# Patient Record
Sex: FEMALE | Race: WHITE | Employment: UNEMPLOYED | URBAN - NONMETROPOLITAN AREA
[De-identification: names, ages, dates, MRNs, and addresses within clinical notes are randomized per-mention and may not be internally consistent; named-entity substitution may affect disease eponyms.]

---

## 2020-10-19 ENCOUNTER — HOSPITAL ENCOUNTER (OUTPATIENT)
Age: 35
Discharge: HOME | End: 2020-10-19
Payer: COMMERCIAL

## 2020-10-19 PROCEDURE — U0003 INFECTIOUS AGENT DETECTION BY NUCLEIC ACID (DNA OR RNA); SEVERE ACUTE RESPIRATORY SYNDROME CORONAVIRUS 2 (SARS-COV-2) (CORONAVIRUS DISEASE [COVID-19]), AMPLIFIED PROBE TECHNIQUE, MAKING USE OF HIGH THROUGHPUT TECHNOLOGIES AS DESCRIBED BY CMS-2020-01-R: HCPCS

## 2020-10-19 RX ORDER — ETONOGESTREL 68 MG/1
68 IMPLANT SUBCUTANEOUS ONCE
COMMUNITY

## 2020-10-19 RX ORDER — CITALOPRAM 20 MG/1
20 TABLET ORAL DAILY
COMMUNITY

## 2020-10-19 RX ORDER — OXYBUTYNIN CHLORIDE 10 MG/1
10 TABLET, EXTENDED RELEASE ORAL DAILY
COMMUNITY

## 2020-10-19 RX ORDER — LISDEXAMFETAMINE DIMESYLATE 60 MG/1
1 CAPSULE ORAL DAILY
COMMUNITY

## 2020-10-19 NOTE — PROGRESS NOTES
In preparation for their surgical procedure above patient was screened for Obstructive Sleep Apnea (ELVIS) using the STOP-Bang Questionnaire by the Pre-Admission Testing department. This is a pre-surgical screening tool for patient safety and serves as a recommendation, this WILL NOT cause cancellation of surgery. STOP-Bang Questionnaire  * Do you currently see a pulmonologist?  No         1. Do you snore loudly (able to be heard in the next room)? No    2. Do you often feel tired or sleepy during the daytime? No       3. Has anyone ever told you that you stop breathing during your sleep? No    4. Do you have or are you being treated for high blood pressure? No      5. BMI more than 35? BMI (Calculated): 40.3        Yes    6. Age over 48 years? 37 y.o. No    7. Neck Circumference greater than 17 inches for male or 16 inches for female? Measured           (visits only)            Not Applicable    8. Gender Male? No      TOTAL SCORE: 1    ELVIS - Low Risk : Yes to 0 - 2 questions  ELVIS - Intermediate Risk : Yes to 3 - 4 questions  ELVIS - High Risk : Yes to 5 - 8 questions    Adapted from:   STOP Questionnaire: A Tool to Screen Patients for Obstructive Sleep Apnea   SUKH Castillo.P.C., Dee Dee Rodriguez M.B.B.S., Kirit Adams M.D., Dayne Potter. Rosalind Lawson, Ph.D., AIDEN Orantes.B.B.S., George Mcmillan, M.Sc., Jessica Funes M.D., Flory Pappas. SUKH Humphrey.P.C.    Anesthesiology 2008; 115:276-24 Copyright 2008, the [address] [employer], [facility] 37.   ----------------------------------------------------------------------------------------------------------------

## 2020-10-19 NOTE — PROGRESS NOTES
Patient instructed not to eat or drink anything after midnight the day before surgery. Please bring list of medications with the dosages & when you take them. If you do not  have a list bring the medications bottles with you. If having a MAC or general anesthetic you MUST have a . Bring photo ID & insurance information. Leave jewelry (watch ,rings, peircings) & other valuables, including extra cash, at home. Wear comfortable clean clothing. When showering or bathing the night before or morning of surgery please use  antibacterial soap. Instructed to call surgery center at [phone] upon arrival to speak with  before entering building.   Covid screen 10/19/2020    Covid screening questionnaire complete and negative for symptoms or exposure see chart for documentation

## 2020-10-21 LAB — SARS-COV-2: NOT DETECTED

## 2020-10-26 ENCOUNTER — HOSPITAL ENCOUNTER (OUTPATIENT)
Age: 35
Setting detail: OUTPATIENT SURGERY
Discharge: HOME | End: 2020-10-26
Attending: SPECIALIST | Admitting: SPECIALIST

## 2020-10-26 ENCOUNTER — ANESTHESIA (OUTPATIENT)
Dept: OPERATING ROOM | Age: 35
End: 2020-10-26

## 2020-10-26 ENCOUNTER — ANESTHESIA EVENT (OUTPATIENT)
Dept: OPERATING ROOM | Age: 35
End: 2020-10-26

## 2020-10-26 VITALS
OXYGEN SATURATION: 95 % | WEIGHT: 217 LBS | SYSTOLIC BLOOD PRESSURE: 146 MMHG | BODY MASS INDEX: 40.97 KG/M2 | RESPIRATION RATE: 12 BRPM | TEMPERATURE: 99.3 F | HEART RATE: 92 BPM | HEIGHT: 61 IN | DIASTOLIC BLOOD PRESSURE: 73 MMHG

## 2020-10-26 VITALS
OXYGEN SATURATION: 100 % | RESPIRATION RATE: 14 BRPM | TEMPERATURE: 98.5 F | SYSTOLIC BLOOD PRESSURE: 118 MMHG | DIASTOLIC BLOOD PRESSURE: 55 MMHG

## 2020-10-26 LAB — PREGNANCY, URINE: NEGATIVE

## 2020-10-26 PROCEDURE — 3600000014 HC SURGERY LEVEL 4 ADDTL 15MIN: Performed by: SPECIALIST

## 2020-10-26 PROCEDURE — 81025 URINE PREGNANCY TEST: CPT

## 2020-10-26 PROCEDURE — 6370000000 HC RX 637 (ALT 250 FOR IP)

## 2020-10-26 PROCEDURE — 3700000001 HC ADD 15 MINUTES (ANESTHESIA): Performed by: SPECIALIST

## 2020-10-26 PROCEDURE — 3600000004 HC SURGERY LEVEL 4 BASE: Performed by: SPECIALIST

## 2020-10-26 PROCEDURE — 6360000002 HC RX W HCPCS: Performed by: ANESTHESIOLOGY

## 2020-10-26 PROCEDURE — 6360000002 HC RX W HCPCS: Performed by: NURSE ANESTHETIST, CERTIFIED REGISTERED

## 2020-10-26 PROCEDURE — C1729 CATH, DRAINAGE: HCPCS | Performed by: SPECIALIST

## 2020-10-26 PROCEDURE — 2580000003 HC RX 258: Performed by: SPECIALIST

## 2020-10-26 PROCEDURE — 7100000010 HC PHASE II RECOVERY - FIRST 15 MIN: Performed by: SPECIALIST

## 2020-10-26 PROCEDURE — 7100000001 HC PACU RECOVERY - ADDTL 15 MIN: Performed by: SPECIALIST

## 2020-10-26 PROCEDURE — 2500000003 HC RX 250 WO HCPCS: Performed by: NURSE ANESTHETIST, CERTIFIED REGISTERED

## 2020-10-26 PROCEDURE — 6370000000 HC RX 637 (ALT 250 FOR IP): Performed by: SPECIALIST

## 2020-10-26 PROCEDURE — 2500000003 HC RX 250 WO HCPCS: Performed by: SPECIALIST

## 2020-10-26 PROCEDURE — 7100000000 HC PACU RECOVERY - FIRST 15 MIN: Performed by: SPECIALIST

## 2020-10-26 PROCEDURE — 7100000011 HC PHASE II RECOVERY - ADDTL 15 MIN: Performed by: SPECIALIST

## 2020-10-26 PROCEDURE — 2709999900 HC NON-CHARGEABLE SUPPLY: Performed by: SPECIALIST

## 2020-10-26 PROCEDURE — 6360000002 HC RX W HCPCS: Performed by: SPECIALIST

## 2020-10-26 PROCEDURE — 3700000000 HC ANESTHESIA ATTENDED CARE: Performed by: SPECIALIST

## 2020-10-26 RX ORDER — DIAZEPAM 5 MG/1
TABLET ORAL
Status: COMPLETED
Start: 2020-10-26 | End: 2020-10-26

## 2020-10-26 RX ORDER — FENTANYL CITRATE 50 UG/ML
50 INJECTION, SOLUTION INTRAMUSCULAR; INTRAVENOUS EVERY 5 MIN PRN
Status: DISCONTINUED | OUTPATIENT
Start: 2020-10-26 | End: 2020-10-26 | Stop reason: HOSPADM

## 2020-10-26 RX ORDER — PROPOFOL 10 MG/ML
INJECTION, EMULSION INTRAVENOUS PRN
Status: DISCONTINUED | OUTPATIENT
Start: 2020-10-26 | End: 2020-10-26 | Stop reason: SDUPTHER

## 2020-10-26 RX ORDER — LIDOCAINE HYDROCHLORIDE 20 MG/ML
INJECTION, SOLUTION EPIDURAL; INFILTRATION; INTRACAUDAL; PERINEURAL PRN
Status: DISCONTINUED | OUTPATIENT
Start: 2020-10-26 | End: 2020-10-26 | Stop reason: SDUPTHER

## 2020-10-26 RX ORDER — BUPIVACAINE HYDROCHLORIDE 5 MG/ML
INJECTION, SOLUTION PERINEURAL PRN
Status: DISCONTINUED | OUTPATIENT
Start: 2020-10-26 | End: 2020-10-26 | Stop reason: ALTCHOICE

## 2020-10-26 RX ORDER — LIDOCAINE HYDROCHLORIDE AND EPINEPHRINE 10; 10 MG/ML; UG/ML
INJECTION, SOLUTION INFILTRATION; PERINEURAL PRN
Status: DISCONTINUED | OUTPATIENT
Start: 2020-10-26 | End: 2020-10-26 | Stop reason: ALTCHOICE

## 2020-10-26 RX ORDER — ONDANSETRON 2 MG/ML
4 INJECTION INTRAMUSCULAR; INTRAVENOUS
Status: COMPLETED | OUTPATIENT
Start: 2020-10-26 | End: 2020-10-26

## 2020-10-26 RX ORDER — ROCURONIUM BROMIDE 10 MG/ML
INJECTION, SOLUTION INTRAVENOUS PRN
Status: DISCONTINUED | OUTPATIENT
Start: 2020-10-26 | End: 2020-10-26 | Stop reason: SDUPTHER

## 2020-10-26 RX ORDER — ONDANSETRON 2 MG/ML
INJECTION INTRAMUSCULAR; INTRAVENOUS PRN
Status: DISCONTINUED | OUTPATIENT
Start: 2020-10-26 | End: 2020-10-26 | Stop reason: SDUPTHER

## 2020-10-26 RX ORDER — MEPERIDINE HYDROCHLORIDE 25 MG/ML
12.5 INJECTION INTRAMUSCULAR; INTRAVENOUS; SUBCUTANEOUS EVERY 5 MIN PRN
Status: DISCONTINUED | OUTPATIENT
Start: 2020-10-26 | End: 2020-10-26 | Stop reason: HOSPADM

## 2020-10-26 RX ORDER — FENTANYL CITRATE 50 UG/ML
INJECTION, SOLUTION INTRAMUSCULAR; INTRAVENOUS PRN
Status: DISCONTINUED | OUTPATIENT
Start: 2020-10-26 | End: 2020-10-26 | Stop reason: SDUPTHER

## 2020-10-26 RX ORDER — DEXAMETHASONE SODIUM PHOSPHATE 4 MG/ML
INJECTION, SOLUTION INTRA-ARTICULAR; INTRALESIONAL; INTRAMUSCULAR; INTRAVENOUS; SOFT TISSUE PRN
Status: DISCONTINUED | OUTPATIENT
Start: 2020-10-26 | End: 2020-10-26 | Stop reason: SDUPTHER

## 2020-10-26 RX ORDER — MIDAZOLAM HYDROCHLORIDE 1 MG/ML
INJECTION INTRAMUSCULAR; INTRAVENOUS PRN
Status: DISCONTINUED | OUTPATIENT
Start: 2020-10-26 | End: 2020-10-26 | Stop reason: SDUPTHER

## 2020-10-26 RX ORDER — DIAZEPAM 5 MG/1
5 TABLET ORAL ONCE
Status: COMPLETED | OUTPATIENT
Start: 2020-10-26 | End: 2020-10-26

## 2020-10-26 RX ORDER — HYDROCODONE BITARTRATE AND ACETAMINOPHEN 5; 325 MG/1; MG/1
1 TABLET ORAL ONCE
Status: COMPLETED | OUTPATIENT
Start: 2020-10-26 | End: 2020-10-26

## 2020-10-26 RX ORDER — GLYCOPYRROLATE 1 MG/5 ML
SYRINGE (ML) INTRAVENOUS PRN
Status: DISCONTINUED | OUTPATIENT
Start: 2020-10-26 | End: 2020-10-26 | Stop reason: SDUPTHER

## 2020-10-26 RX ORDER — NEOSTIGMINE METHYLSULFATE 5 MG/5 ML
SYRINGE (ML) INTRAVENOUS PRN
Status: DISCONTINUED | OUTPATIENT
Start: 2020-10-26 | End: 2020-10-26 | Stop reason: SDUPTHER

## 2020-10-26 RX ORDER — SODIUM CHLORIDE 9 MG/ML
INJECTION, SOLUTION INTRAVENOUS CONTINUOUS
Status: DISCONTINUED | OUTPATIENT
Start: 2020-10-26 | End: 2020-10-26 | Stop reason: HOSPADM

## 2020-10-26 RX ADMIN — SODIUM CHLORIDE: 9 INJECTION, SOLUTION INTRAVENOUS at 07:48

## 2020-10-26 RX ADMIN — LIDOCAINE HYDROCHLORIDE 80 MG: 20 INJECTION, SOLUTION EPIDURAL; INFILTRATION; INTRACAUDAL; PERINEURAL at 07:56

## 2020-10-26 RX ADMIN — FENTANYL CITRATE 50 MCG: 50 INJECTION, SOLUTION INTRAMUSCULAR; INTRAVENOUS at 09:16

## 2020-10-26 RX ADMIN — Medication 4 MG: at 11:03

## 2020-10-26 RX ADMIN — ROCURONIUM BROMIDE 20 MG: 10 INJECTION INTRAVENOUS at 09:07

## 2020-10-26 RX ADMIN — CEFAZOLIN 2 G: 10 INJECTION, POWDER, FOR SOLUTION INTRAVENOUS at 08:01

## 2020-10-26 RX ADMIN — DIAZEPAM 5 MG: 5 TABLET ORAL at 11:41

## 2020-10-26 RX ADMIN — ROCURONIUM BROMIDE 10 MG: 10 INJECTION INTRAVENOUS at 08:50

## 2020-10-26 RX ADMIN — ROCURONIUM BROMIDE 50 MG: 10 INJECTION INTRAVENOUS at 07:56

## 2020-10-26 RX ADMIN — PROPOFOL 150 MG: 10 INJECTION, EMULSION INTRAVENOUS at 07:56

## 2020-10-26 RX ADMIN — Medication 0.6 MG: at 11:03

## 2020-10-26 RX ADMIN — FENTANYL CITRATE 50 MCG: 50 INJECTION, SOLUTION INTRAMUSCULAR; INTRAVENOUS at 11:43

## 2020-10-26 RX ADMIN — ROCURONIUM BROMIDE 10 MG: 10 INJECTION INTRAVENOUS at 09:36

## 2020-10-26 RX ADMIN — HYDROCODONE BITARTRATE AND ACETAMINOPHEN 1 TABLET: 5; 325 TABLET ORAL at 12:18

## 2020-10-26 RX ADMIN — FENTANYL CITRATE 50 MCG: 50 INJECTION, SOLUTION INTRAMUSCULAR; INTRAVENOUS at 08:23

## 2020-10-26 RX ADMIN — FENTANYL CITRATE 50 MCG: 50 INJECTION, SOLUTION INTRAMUSCULAR; INTRAVENOUS at 11:34

## 2020-10-26 RX ADMIN — MIDAZOLAM HYDROCHLORIDE 2 MG: 1 INJECTION, SOLUTION INTRAMUSCULAR; INTRAVENOUS at 07:54

## 2020-10-26 RX ADMIN — DEXAMETHASONE SODIUM PHOSPHATE 8 MG: 4 INJECTION, SOLUTION INTRAMUSCULAR; INTRAVENOUS at 08:04

## 2020-10-26 RX ADMIN — FENTANYL CITRATE 100 MCG: 50 INJECTION, SOLUTION INTRAMUSCULAR; INTRAVENOUS at 10:12

## 2020-10-26 RX ADMIN — FENTANYL CITRATE 50 MCG: 50 INJECTION, SOLUTION INTRAMUSCULAR; INTRAVENOUS at 07:56

## 2020-10-26 RX ADMIN — ONDANSETRON HYDROCHLORIDE 4 MG: 4 INJECTION, SOLUTION INTRAMUSCULAR; INTRAVENOUS at 10:52

## 2020-10-26 RX ADMIN — ONDANSETRON 4 MG: 2 INJECTION INTRAMUSCULAR; INTRAVENOUS at 11:27

## 2020-10-26 RX ADMIN — ROCURONIUM BROMIDE 20 MG: 10 INJECTION INTRAVENOUS at 08:30

## 2020-10-26 RX ADMIN — ROCURONIUM BROMIDE 15 MG: 10 INJECTION INTRAVENOUS at 10:12

## 2020-10-26 RX ADMIN — SODIUM CHLORIDE: 9 INJECTION, SOLUTION INTRAVENOUS at 11:00

## 2020-10-26 RX ADMIN — FENTANYL CITRATE 50 MCG: 50 INJECTION, SOLUTION INTRAMUSCULAR; INTRAVENOUS at 08:30

## 2020-10-26 ASSESSMENT — PULMONARY FUNCTION TESTS
PIF_VALUE: 2
PIF_VALUE: 31
PIF_VALUE: 33
PIF_VALUE: 36
PIF_VALUE: 36
PIF_VALUE: 28
PIF_VALUE: 1
PIF_VALUE: 2
PIF_VALUE: 28
PIF_VALUE: 29
PIF_VALUE: 36
PIF_VALUE: 31
PIF_VALUE: 27
PIF_VALUE: 36
PIF_VALUE: 30
PIF_VALUE: 36
PIF_VALUE: 29
PIF_VALUE: 32
PIF_VALUE: 26
PIF_VALUE: 2
PIF_VALUE: 31
PIF_VALUE: 26
PIF_VALUE: 28
PIF_VALUE: 3
PIF_VALUE: 32
PIF_VALUE: 27
PIF_VALUE: 2
PIF_VALUE: 31
PIF_VALUE: 32
PIF_VALUE: 29
PIF_VALUE: 2
PIF_VALUE: 27
PIF_VALUE: 28
PIF_VALUE: 3
PIF_VALUE: 33
PIF_VALUE: 28
PIF_VALUE: 29
PIF_VALUE: 32
PIF_VALUE: 35
PIF_VALUE: 30
PIF_VALUE: 28
PIF_VALUE: 28
PIF_VALUE: 37
PIF_VALUE: 2
PIF_VALUE: 33
PIF_VALUE: 36
PIF_VALUE: 26
PIF_VALUE: 28
PIF_VALUE: 27
PIF_VALUE: 31
PIF_VALUE: 33
PIF_VALUE: 29
PIF_VALUE: 30
PIF_VALUE: 26
PIF_VALUE: 32
PIF_VALUE: 30
PIF_VALUE: 3
PIF_VALUE: 34
PIF_VALUE: 2
PIF_VALUE: 29
PIF_VALUE: 27
PIF_VALUE: 31
PIF_VALUE: 31
PIF_VALUE: 32
PIF_VALUE: 30
PIF_VALUE: 2
PIF_VALUE: 33
PIF_VALUE: 32
PIF_VALUE: 33
PIF_VALUE: 37
PIF_VALUE: 5
PIF_VALUE: 29
PIF_VALUE: 32
PIF_VALUE: 32
PIF_VALUE: 27
PIF_VALUE: 30
PIF_VALUE: 3
PIF_VALUE: 3
PIF_VALUE: 31
PIF_VALUE: 28
PIF_VALUE: 32
PIF_VALUE: 38
PIF_VALUE: 2
PIF_VALUE: 31
PIF_VALUE: 31
PIF_VALUE: 2
PIF_VALUE: 27
PIF_VALUE: 30
PIF_VALUE: 23
PIF_VALUE: 31
PIF_VALUE: 37
PIF_VALUE: 27
PIF_VALUE: 31
PIF_VALUE: 37
PIF_VALUE: 19
PIF_VALUE: 31
PIF_VALUE: 2
PIF_VALUE: 31
PIF_VALUE: 35
PIF_VALUE: 30
PIF_VALUE: 31
PIF_VALUE: 31
PIF_VALUE: 2
PIF_VALUE: 30
PIF_VALUE: 32
PIF_VALUE: 27
PIF_VALUE: 31
PIF_VALUE: 26
PIF_VALUE: 30
PIF_VALUE: 36
PIF_VALUE: 36
PIF_VALUE: 35
PIF_VALUE: 33
PIF_VALUE: 37
PIF_VALUE: 36
PIF_VALUE: 30
PIF_VALUE: 35
PIF_VALUE: 34
PIF_VALUE: 30
PIF_VALUE: 33
PIF_VALUE: 38
PIF_VALUE: 27
PIF_VALUE: 30
PIF_VALUE: 39
PIF_VALUE: 2
PIF_VALUE: 23
PIF_VALUE: 32
PIF_VALUE: 3
PIF_VALUE: 27
PIF_VALUE: 32
PIF_VALUE: 29
PIF_VALUE: 2
PIF_VALUE: 28
PIF_VALUE: 38
PIF_VALUE: 32
PIF_VALUE: 33
PIF_VALUE: 28
PIF_VALUE: 30
PIF_VALUE: 37
PIF_VALUE: 32
PIF_VALUE: 30
PIF_VALUE: 23
PIF_VALUE: 31
PIF_VALUE: 31
PIF_VALUE: 28
PIF_VALUE: 36
PIF_VALUE: 36
PIF_VALUE: 29
PIF_VALUE: 31
PIF_VALUE: 27
PIF_VALUE: 37
PIF_VALUE: 33
PIF_VALUE: 32
PIF_VALUE: 32
PIF_VALUE: 2
PIF_VALUE: 29
PIF_VALUE: 32
PIF_VALUE: 31
PIF_VALUE: 29
PIF_VALUE: 37
PIF_VALUE: 36
PIF_VALUE: 2
PIF_VALUE: 37
PIF_VALUE: 32
PIF_VALUE: 33
PIF_VALUE: 36
PIF_VALUE: 5
PIF_VALUE: 3
PIF_VALUE: 30
PIF_VALUE: 3
PIF_VALUE: 28
PIF_VALUE: 32
PIF_VALUE: 31
PIF_VALUE: 33
PIF_VALUE: 31
PIF_VALUE: 19
PIF_VALUE: 30
PIF_VALUE: 33
PIF_VALUE: 30
PIF_VALUE: 26
PIF_VALUE: 28
PIF_VALUE: 2
PIF_VALUE: 28
PIF_VALUE: 36
PIF_VALUE: 3
PIF_VALUE: 32
PIF_VALUE: 29
PIF_VALUE: 32
PIF_VALUE: 31
PIF_VALUE: 27
PIF_VALUE: 2
PIF_VALUE: 29
PIF_VALUE: 34
PIF_VALUE: 27
PIF_VALUE: 28
PIF_VALUE: 28
PIF_VALUE: 32
PIF_VALUE: 36
PIF_VALUE: 32
PIF_VALUE: 32
PIF_VALUE: 26
PIF_VALUE: 28

## 2020-10-26 ASSESSMENT — PAIN SCALES - GENERAL
PAINLEVEL_OUTOF10: 8
PAINLEVEL_OUTOF10: 8
PAINLEVEL_OUTOF10: 10
PAINLEVEL_OUTOF10: 7

## 2020-10-26 ASSESSMENT — LIFESTYLE VARIABLES: SMOKING_STATUS: 1

## 2020-10-26 ASSESSMENT — PAIN - FUNCTIONAL ASSESSMENT: PAIN_FUNCTIONAL_ASSESSMENT: 0-10

## 2020-10-26 ASSESSMENT — PAIN DESCRIPTION - LOCATION: LOCATION: ABDOMEN

## 2020-10-26 ASSESSMENT — PAIN DESCRIPTION - PAIN TYPE: TYPE: ACUTE PAIN

## 2020-10-26 NOTE — OP NOTE
Operative Note    Patient name: Trent Gallagher             Medical Record Number: [id number]    Primary Care Physician: Mary Haynes DO     1985    Date of Procedure: 10/26/2020    Pre-operative Diagnosis:  Unacceptable cosmetic appearance. Post-operative Diagnosis: Same    Procedure Performed:   1. Abdominoplasty with suction assisted lipectomy (1800 g resection and 1200 ml liposuction). Surgeons/Assistants: Dr. Liliane Martinez PA-C     Estimated Blood Loss: 976 ml    Complications: none immediately appreciated    Procedure: With the patient lying initially in the supine position, under adequate general anesthesia by the anesthesia team. After the SCDs were placed bilaterally the patient was administered IV antibiotics properly. The entire abdomen was draped in standard sterile fashion. The lower abdominal incision was then made as been defined with the patient in the preoperative standing position undermining was carried up to the periumbilical location and then just along the paramedian location leaving the upper outer quadrants of the abdomen un-dissected. The umbilicus released and tucked beneath the abdominal flap. The rectus fascia had been plicated using a 0 braided nylon sutures placed in a simple interrupted buried fashion. A total of 50 ml of 0.5% Marcaine with epinephrine solution was used to anesthetize the rectus fascia. The patient was flexed approximately 25 degrees and the excess abdominal tissue was then removed. The preoperatively marked fat deposits of the upper abdomen and waist area were injected with tumescent solution (using a superwet technique) and then the superficial fascial system was then closed with a 0 Vicryl suture in interrupted fashion. Two 19 carlo drains were placed through separate para pubic incisions and secured with a 2-0 Silk suture.   Final closure of the abdominal incision was performed using a 3-0 Monocryl suture placed in interrupted, buried fashion. Then a 2-0 Quill suture placed in running subcuticular fashion after the liposuction was performed in a crisscrossing pattern of the suprapubic, upper abdominal and lovehandle/flank fat deposits. Bacitracin and bulky sterile dressings were applied as a final dressing over the top of these incisions. The patient tolerated the procedure well and remained hemodynamically stable throughout the procedure. The patient was extubated and transported to the recovery room in stable condition. All sponge, needle, and instrument counts were correct. Luis Messer  Electronically signed by me on 10/26/2020 at 11:25 AM  Operative Note      Patient: Belle Noriega  YOB: 1985  MRN: [id number]    Date of Procedure: 10/26/2020    Pre-Op Diagnosis: COSMETIC    Post-Op Diagnosis: Same       Procedure(s):  ABDOMINOPLASTY WITH LIPOSUCTION    Surgeon(s):  Luis Messer MD    Assistant:   Physician Assistant: Lu Yung PA-C    Anesthesia: General    Estimated Blood Loss (mL): Minimal    Complications: None    Specimens:   * No specimens in log *    Implants:  * No implants in log *      Drains:   Closed/Suction Drain Right; Anterior; Lateral Thigh; Abdomen Bulb 19 [place] (Active)       Closed/Suction Drain Left; Anterior; Lateral Hip; Abdomen Bulb 19 [place] (Active)       Findings: Unacceptable cosmetic appearance. Detailed Description of Procedure:   1. Abdominoplasty with suction assisted lipectomy (1800 g resection and 1200 ml liposuction).      Electronically signed by Luis Messer MD on 10/26/2020 at 11:24 AM

## 2020-10-26 NOTE — H&P
[facility]  History and Physical Update    Pt Name: Albert Mendoza  MRN: [id number]  YOB: 1985  Date of evaluation: 10/26/2020    I have examined the patient and reviewed the H&P/Consult and there are no changes to the patient or plans.       Emmy Hoffmann  Electronically signed 10/26/2020 at 6:53 AM

## 2020-10-26 NOTE — PROGRESS NOTES
1126: Patient to phase 1 recovery room via cart. Patient arouses easily to name. Patient placed on monitor and vitals obtained, see charting. Report received from surgical RN, Phu Ruvalcaba and [address] [place] [address] student CRNA. Patient is on room air at this time. 1127: Patient stating she is nauseous. Zofran given- see MAR.   6660: IV is infusing into her left hand. Dressing to her abdomen is clean, dry and intact- see LDA. 2 CHARLEY drains in place and are to bulb suction. SCD's attached. 1134: Patient stating nausea is better and is having pain rating it a \"8\"/10. Fentanyl given- see MAR.   1138: Dr. Patrick Ritchie is at the bedside to see the patient and check dressing. Dr. Patrick Ritchie ordering to given 5 mg of Valium- see orders. 1141: Valium given- see MAR.   1143: Patient continuing to rate pain a \"8\"/10 and denying nausea. 50 mcg of Fentanyl given- see MAR.   1145: Patient stating she needs to use the bedpan. Patient on bedpan at this time. 1147: Patient did void and off bedpan at this time. 1149: Patient is rating pain a \"7\"/10 and stating it is tolerable for her. 1151: Patient is resting in bed with eyes closed, respirations are even and unlabored. Blood pressure has improved since pain medication given. 1158: Patient is resting in bed with eyes closed, respirations are even and unlabored. Vital signs remain stable on room air.   1201: Patient is continuing to rate pain a \"7\"/10 but stating she is much more calmer and the pain is tolerable. Patient stating she will take pain pills. 1205: Patient meets criteria to be discharged from phase 1 to phase 2. Patient moved to phase 2 at this time. 1207: Patient repositioned in the bed. Patient's dressing and CHARLEY drains checked at this time- assessment unchanged from previous. 1209: Offered drink and snack provided to the patient. Bed is in the lowest position and call light is within reach.  Patient instructed to put her call light on when she is ready for a pin pill- she verbalized understanding. 1218: Norco given for a pain level of \"10\"/10 and denying nausea- see MAR.   1220: Warmer attached and warm blanket given. 1255: Patient is rating pain a \"6\"/10 and denying nausea. Patient stating she thinks she is ready to go home. 1309: Discharge instructions given and explained to the patient and the patient's parents, both verbalized understanding. Drains emptied at this time- see charting. 1312: IV switched to an INT and patient's mom is assisting her with getting dressed. 1322: Small drainage noted on abdominal binder above CHARLEY drain. Dr. Emanuel Chauhan called and notified. 1326: Dr. Emanuel Chauhan stating to reinforce the dressing. The drainage is coming from the port site from liposuction. Dressing reinforced at this time. 1329: Patient dressed and to the wheelchair at this time. 1331: Patient to the bathroom at this time via wheelchair. 1334: Patient's IV removed at this time- no complications and dressing applied. 1337: Patient wheeled to the car and discharged home in stable condition with her parents.

## 2020-10-26 NOTE — ANESTHESIA PRE PROCEDURE
Department of Anesthesiology  Preprocedure Note       Name:  Zuhair Hdez   Age:  37 y.o.  :  1985                                          MRN:  [id number]         Date:  10/26/2020      Surgeon: Milagro Bird):  Elbert Garcia MD    Procedure: Procedure(s):  ABDOMINOPLASTY WITH LIPOSUCTION    Medications prior to admission:   Prior to Admission medications    Medication Sig Start Date End Date Taking? Authorizing Provider   citalopram (CELEXA) 20 MG tablet Take 20 mg by mouth daily   Yes Historical Provider, MD   oxybutynin (DITROPAN-XL) 10 MG extended release tablet Take 10 mg by mouth daily TAKES AT NIGHT   Yes Historical Provider, MD   Lisdexamfetamine Dimesylate (VYVANSE) 60 MG CAPS Take 1 tablet by mouth daily. Yes Historical Provider, MD   etonogestrel (NEXPLANON) 68 MG implant 68 mg by Subdermal route once   Yes Historical Provider, MD   Multiple Vitamins-Minerals (MULTIVITAMIN ADULTS PO) Take by mouth daily   Yes Historical Provider, MD       Current medications:    Current Facility-Administered Medications   Medication Dose Route Frequency Provider Last Rate Last Dose    0.9 % sodium chloride infusion   Intravenous Continuous Elbert Garcia MD        ceFAZolin (ANCEF) 2 g in dextrose 5 % 50 mL IVPB  2 g Intravenous On Call to 56 Mago Middleton MD           Allergies:  No Known Allergies    Problem List:  There is no problem list on file for this patient.       Past Medical History:        Diagnosis Date    ADHD     Asthma     AS CHILD    Seizures ([facility] [address]     RELATED TO ADHD       Past Surgical History:        Procedure Laterality Date     SECTION      FOOT SURGERY Left        Social History:    Social History     Tobacco Use    Smoking status: Current Every Day Smoker     Packs/day: 0.50    Smokeless tobacco: Never Used   Substance Use Topics    Alcohol use: Not Currently                                Ready to quit: Not Answered  Counseling given: Not Answered      Vital Signs (Current):   Vitals:    10/19/20 1334 10/26/20 0722   BP:  130/60   Pulse:  91   Resp:  16   Temp:  96.6 °F (35.9 °C)   TempSrc:  Temporal   SpO2:  98%   Weight: 213 lb (96.6 kg) 217 lb (98.4 kg)   Height: 5' 1\" (1.549 m)                                               BP Readings from Last 3 Encounters:   10/26/20 130/60       NPO Status: Time of last liquid consumption: 2130                        Time of last solid consumption: 1900                        Date of last liquid consumption: 10/25/20                        Date of last solid food consumption: 10/25/20    BMI:   Wt Readings from Last 3 Encounters:   10/26/20 217 lb (98.4 kg)     Body mass index is 41 kg/m². CBC: No results found for: WBC, RBC, HGB, HCT, MCV, RDW, PLT    CMP: No results found for: NA, K, CL, CO2, BUN, CREATININE, GFRAA, AGRATIO, LABGLOM, GLUCOSE, PROT, CALCIUM, BILITOT, ALKPHOS, AST, ALT    POC Tests: No results for input(s): POCGLU, POCNA, POCK, POCCL, POCBUN, POCHEMO, POCHCT in the last 72 hours. Coags: No results found for: PROTIME, INR, APTT    HCG (If Applicable):   Lab Results   Component Value Date    PREGTESTUR negative 10/26/2020        ABGs: No results found for: PHART, PO2ART, ICF8LRB, XHW7HJU, BEART, [id number]     Type & Screen (If Applicable):  No results found for: LABABO, LABRH    Drug/Infectious Status (If Applicable):  No results found for: HIV, HEPCAB    COVID-19 Screening (If Applicable):   Lab Results   Component Value Date    COVID19 Not Detected 10/19/2020         Anesthesia Evaluation  Patient summary reviewed and Nursing notes reviewed no history of anesthetic complications:   Airway: Mallampati: II  TM distance: >3 FB   Neck ROM: full  Mouth opening: > = 3 FB Dental:          Pulmonary:normal exam  breath sounds clear to auscultation  (+) asthma: current smoker          Patient did not smoke on day of surgery.                  Cardiovascular:Negative CV ROS  Exercise tolerance: good (>4 METS), Neuro/Psych:   (+) seizures:, psychiatric history:            GI/Hepatic/Renal:   (+) morbid obesity          Endo/Other: Negative Endo/Other ROS             Pt had no PAT visit       Abdominal:   (+) obese,     Abdomen: soft. Vascular: negative vascular ROS. Anesthesia Plan      general     ASA 3       Induction: intravenous. MIPS: Postoperative opioids intended and Prophylactic antiemetics administered. Anesthetic plan and risks discussed with patient. Plan discussed with CRNA.                   [address] [place] [address],    10/26/2020

## 2020-10-26 NOTE — ANESTHESIA POSTPROCEDURE EVALUATION
Department of Anesthesiology  Postprocedure Note    Patient: Wolf Bajwa  MRN: [id number]  YOB: 1985  Date of evaluation: 10/26/2020  Time:  2:06 PM     Procedure Summary     Date:  10/26/20 Room / Location:  [facility] 04 / [facility]    Anesthesia Start:  6776 Anesthesia Stop:  8734    Procedure:  ABDOMINOPLASTY WITH LIPOSUCTION (N/A ) Diagnosis:  (COSMETIC)    Surgeon:  Lisbeth Soliz MD Responsible Provider:  Mayelin Arvizu DO    Anesthesia Type:  general ASA Status:  3          Anesthesia Type: general    John Phase I: John Score: 10    John Phase II: John Score: 10    Last vitals: Reviewed and per EMR flowsheets.        Anesthesia Post Evaluation    Patient location during evaluation: PACU  Patient participation: complete - patient participated  Level of consciousness: awake and alert  Pain score: 3  Airway patency: patent  Nausea & Vomiting: no nausea and no vomiting  Complications: no  Cardiovascular status: hemodynamically stable and blood pressure returned to baseline  Respiratory status: spontaneous ventilation, room air and acceptable  Hydration status: stable

## (undated) DEVICE — APPLICATOR MEDICATED 26 CC SOLUTION CLR STRL CHLORAPREP

## (undated) DEVICE — YANKAUER,BULB TIP,W/O VENT,RIGID,STERILE: Brand: MEDLINE

## (undated) DEVICE — SHEET, ORTHO, SPLIT, STERILE: Brand: MEDLINE

## (undated) DEVICE — DRAPE,TOP,102X53,STERILE: Brand: MEDLINE

## (undated) DEVICE — HYPODERMIC SAFETY NEEDLE: Brand: MAGELLAN

## (undated) DEVICE — STRIP SKIN CLSR W0.25XL4IN WHT SPUNBOUND FBR NYL HI ADH

## (undated) DEVICE — TUBING, SUCTION, 1/4" X 12', STRAIGHT: Brand: MEDLINE

## (undated) DEVICE — BINDER ABD 2XL H12XL60 75IN UNISX STD E 4 PNL DISPOSABLE

## (undated) DEVICE — SPONGE LAP W18XL18IN WHT COT 4 PLY FLD STRUNG RADPQ DISP ST

## (undated) DEVICE — BANDAGE COMPR M W4INXL10YD WHT BGE VELC E MTRX HK AND LOOP

## (undated) DEVICE — SOLUTION IV 1000ML 0.9% SOD CHL PH 5 INJ USP VIAFLX PLAS

## (undated) DEVICE — TUBING ASPIR L12FT FOR LIPO SYS PSI-TEC

## (undated) DEVICE — TUBING IRRIG Y 2 SPIK CONN LIPO

## (undated) DEVICE — SYRINGE MED 30ML STD CLR PLAS LUERLOCK TIP N CTRL DISP

## (undated) DEVICE — GLOVE ORANGE PI 7   MSG9070

## (undated) DEVICE — Device

## (undated) DEVICE — SYRINGE IRRIG 60ML SFT PLIABLE BLB EZ TO GRP 1 HND USE W/

## (undated) DEVICE — PAD,ABDOMINAL,5"X9",ST,LF,25/BX: Brand: MEDLINE INDUSTRIES, INC.

## (undated) DEVICE — GLOVE SURG SZ 8 L11.77IN FNGR THK9.8MIL STRW LTX POLYMER

## (undated) DEVICE — DRAIN CHN 19FR L0.25IN DIA6.3MM SIL RND HUBLESS FULL FLUT

## (undated) DEVICE — PACK PROCEDURE SURG PLAS SC MIN SRHP LF

## (undated) DEVICE — EVACUATOR SURG 100CC SIL BLB SUCT RESVR FOR CLS WND DRNGE

## (undated) DEVICE — DRESSING,GAUZE,XEROFORM,CURAD,5"X9",ST: Brand: CURAD